# Patient Record
Sex: FEMALE | Race: WHITE | NOT HISPANIC OR LATINO | Employment: FULL TIME | URBAN - NONMETROPOLITAN AREA
[De-identification: names, ages, dates, MRNs, and addresses within clinical notes are randomized per-mention and may not be internally consistent; named-entity substitution may affect disease eponyms.]

---

## 2018-08-09 ENCOUNTER — HISTORICAL (OUTPATIENT)
Dept: ADMINISTRATIVE | Facility: HOSPITAL | Age: 43
End: 2018-08-09

## 2021-02-26 ENCOUNTER — HISTORICAL (OUTPATIENT)
Dept: ADMINISTRATIVE | Facility: HOSPITAL | Age: 46
End: 2021-02-26

## 2021-10-13 ENCOUNTER — HISTORICAL (OUTPATIENT)
Dept: ADMINISTRATIVE | Facility: HOSPITAL | Age: 46
End: 2021-10-13

## 2022-04-10 ENCOUNTER — HISTORICAL (OUTPATIENT)
Dept: ADMINISTRATIVE | Facility: HOSPITAL | Age: 47
End: 2022-04-10

## 2022-04-26 VITALS
DIASTOLIC BLOOD PRESSURE: 78 MMHG | BODY MASS INDEX: 32.37 KG/M2 | WEIGHT: 189.63 LBS | SYSTOLIC BLOOD PRESSURE: 126 MMHG | HEIGHT: 64 IN

## 2022-07-06 ENCOUNTER — HISTORICAL (OUTPATIENT)
Dept: ADMINISTRATIVE | Facility: HOSPITAL | Age: 47
End: 2022-07-06

## 2023-07-24 ENCOUNTER — TELEPHONE (OUTPATIENT)
Dept: OBSTETRICS AND GYNECOLOGY | Facility: CLINIC | Age: 48
End: 2023-07-24

## 2023-07-24 DIAGNOSIS — Z12.31 BREAST CANCER SCREENING BY MAMMOGRAM: Primary | ICD-10-CM

## 2023-07-24 NOTE — TELEPHONE ENCOUNTER
----- Message from Toshia Contreras sent at 7/24/2023  2:05 PM CDT -----  Regarding: Mammo Order  Type:  Mammogram    Caller is requesting to schedule their annual mammogram appointment.  Order is not listed in EPIC.  Please enter order and contact patient to schedule.  Name of Caller:  Where would they like the mammogram performed?  Would the patient rather a call back or a response via [facility]?   Best Call Back Number:  Additional Information: Asking for mammo order to be put into epic.

## 2023-07-28 ENCOUNTER — LAB VISIT (OUTPATIENT)
Dept: LAB | Facility: HOSPITAL | Age: 48
End: 2023-07-28
Attending: INTERNAL MEDICINE
Payer: COMMERCIAL

## 2023-07-28 DIAGNOSIS — E03.9 PRIMARY HYPOTHYROIDISM: Primary | ICD-10-CM

## 2023-07-28 LAB
T4 FREE SERPL-MCNC: 1.22 NG/DL (ref 0.78–2.19)
TSH SERPL-ACNC: 0.33 UIU/ML (ref 0.36–3.74)

## 2023-07-28 PROCEDURE — 84443 ASSAY THYROID STIM HORMONE: CPT

## 2023-07-28 PROCEDURE — 84439 ASSAY OF FREE THYROXINE: CPT

## 2023-07-28 PROCEDURE — 36415 COLL VENOUS BLD VENIPUNCTURE: CPT

## 2023-08-01 ENCOUNTER — HOSPITAL ENCOUNTER (OUTPATIENT)
Dept: RADIOLOGY | Facility: HOSPITAL | Age: 48
Discharge: HOME | End: 2023-08-01
Attending: NURSE PRACTITIONER
Payer: COMMERCIAL

## 2023-08-01 VITALS — WEIGHT: 188 LBS | BODY MASS INDEX: 32.1 KG/M2 | HEIGHT: 64 IN

## 2023-08-01 DIAGNOSIS — Z12.31 BREAST CANCER SCREENING BY MAMMOGRAM: ICD-10-CM

## 2023-08-01 PROCEDURE — 77067 SCR MAMMO BI INCL CAD: CPT | Mod: TC

## 2024-01-12 DIAGNOSIS — Z01.411 ENCOUNTER FOR GYNECOLOGICAL EXAMINATION (GENERAL) (ROUTINE) WITH ABNORMAL FINDINGS: ICD-10-CM

## 2024-01-12 DIAGNOSIS — Z80.3 FAMILY HISTORY OF MALIGNANT NEOPLASM OF BREAST: ICD-10-CM

## 2024-01-12 RX ORDER — CONJUGATED ESTROGENS AND MEDROXYPROGESTERONE ACETATE .625; 5 MG/1; MG/1
1 TABLET, SUGAR COATED ORAL
Qty: 28 TABLET | Refills: 1 | Status: SHIPPED | OUTPATIENT
Start: 2024-01-12 | End: 2024-02-15

## 2024-01-16 ENCOUNTER — TELEPHONE (OUTPATIENT)
Dept: OBSTETRICS AND GYNECOLOGY | Facility: CLINIC | Age: 49
End: 2024-01-16
Payer: COMMERCIAL

## 2024-02-01 NOTE — PROGRESS NOTES
Chief Complaint: Annual exam    Chief Complaint   Patient presents with    Annual Exam     C/O Lower back pain X2 weeks. PAP 2022, S/P Tubal / ablation. Denies hot flashes/night sweats . C/O Vaginal dryness.        HPI:   48 y.o. F  presents for an annual gyn exam. S/p tubal ligation and endometrial ablation. Currently using Prempro and Premarin vaginal cream for HRT, requesting refills. C/o vaginal dryness. Reports lower back pain x 2 weeks, denies UTI symptoms.     Last pap: 2022 NIL -HPV  MM2023 benign     FmHx: MGM c colon cancer. Pat cousin and Mat cousin c breast cancer. Denies uterine and ovarian cancers.     Labs / Significant Studies:  LMP: Ablation/ Tubal   Frequency: NA  Cycle length: 0 days  Flow: NONE  Intermenstrual bleeding: NO  Postcoital bleeding: NO  Dysmenorrhea: NO  Sexually active: YES  Dyspareunia: NO  Contraception: Tubal/ Ablation  H/o STI: HPV @ age 19  Last pap: 22, NIL, - HPV   H/o abnl pap: Yes,  LGSIL  Colposcopy: , Benign   Gardasil: 0/3  MM23, Benign   H/o abnl MMG: NEVER  Colonoscopy: Never     Family History   Problem Relation Age of Onset    Colon cancer Maternal Grandmother 68         due to CA    Breast cancer Paternal Cousin 60        Passed away from stroke    Breast cancer Maternal Cousin 55        Passed away not from CA    Uterine cancer Neg Hx     Cervical cancer Neg Hx     Ovarian cancer Neg Hx          Past Medical History:   Diagnosis Date    Abnormal Pap smear of cervix     LGSIL    Hypertension     Thyroid disease      Past Surgical History:   Procedure Laterality Date    BILATERAL TUBAL LIGATION  2004     SECTION       SECTION       SECTION      CHOLECYSTECTOMY      COLPOSCOPY      Benign    ENDOMETRIAL ABLATION      FUNCTIONAL ENDOSCOPIC SINUS SURGERY (FESS)  2002    TONSILLECTOMY AND ADENOIDECTOMY         Current Outpatient Medications:     estrogen,  conjugated,-medroxyprogesterone (PREMPRO) 0.625-5 mg per tablet, Take by mouth., Disp: , Rfl:     hydroCHLOROthiazide (HYDRODIURIL) 25 MG tablet, Take 25 mg by mouth every morning., Disp: , Rfl:     PREMARIN vaginal cream, insert 0.5 grams vaginally at bedtime for 14 days then twice weekly thereafter, Disp: 30 g, Rfl: 6    simvastatin (ZOCOR) 10 MG tablet, Take 10 mg by mouth., Disp: , Rfl:     SYNTHROID 50 mcg tablet, Take 50 mcg by mouth every morning., Disp: , Rfl:     Review of patient's allergies indicates:   Allergen Reactions    Latex Hives    Sulfa (sulfonamide antibiotics) Hives       Social History     Tobacco Use    Smoking status: Former     Types: Cigarettes    Smokeless tobacco: Never   Substance Use Topics    Alcohol use: Not Currently    Drug use: Never       Review of Systems:  General/Constitutional: Chills denies. Fatigue/weakness denies. Fever denies. Night sweats denies. Hot flashes denies    Respiratory: Cough denies. Hemoptysis denies. SOB denies. Sputum production denies. Wheezing denies .   Cardiovascular: Chest pain denies . Dizziness denies. Palpitations denies. Swelling in hands/feet denies    Gastrointestinal: Abdominal pain denies. Blood in stool denies. Constipation denies. Diarrhea denies. Heartburn denies. Nausea denies. Vomiting denies    Genitourinary: Incontinence denies. Blood in urine denies. Frequent urination denies. Painful urination denies. Urinary urgency denies. Nocturia denies    Gynecologic: Irregular menses denies. Heavy bleeding denies. Painful menses denies. Vaginal discharge denies. Vaginal odor denies. Vaginal itching denies. Vaginal lesion denies. Pelvic pain denies. Decreased libido denies. Vulvar lesion denies. Prolapse of genital organs denies. Painful intercourse denies. Postcoital bleeding denies. Vaginal dryness admits   Psychiatric: Depression denies. Anxiety denies     Physical Exam:   Vitals:    02/15/24 0852   BP: 136/86   Weight: 77.3 kg (170 lb 6.7 oz)  "  Height: 5' 4" (1.626 m)       Body mass index is 29.25 kg/m².       Chaperone: present.     General appearance: healthy, well-nourished and well-developed     Psychiatric: Orientation to time, place and person. Normal mood and affect and active, alert     Skin: Appearance: no rashes or lesions.     Neck:   Neck: supple, FROM, trachea midline. and no masses   Thyroid: no enlargement or nodules and non-tender.       Cardiovascular:   Auscultation: RRR and no murmur.   Peripheral Vascular: no varicosities, LLE edema, RLE edema, calf tenderness, and palpable cord and pedal pulses intact.     Lungs:   Respiratory effort: no intercostal retractions or accessory muscle usage.   Auscultation: no wheezing, rales/crackles, or rhonchi and clear to auscultation.     Breast:   Inspection/Palpation: no tenderness, discrete/distinct masses, skin changes, or abnormal secretions. Nipple appearance normal. Bilateral +FBC    Abdomen:   Auscultation/Inspection/Palpation: no hepatomegaly, splenomegaly, masses, tenderness or CVA tenderness and soft, non-distended bowel sounds preset.    Hernia: no palpable hernias.     Female Genitalia:    Vulva: no masses, tenderness or lesions    Bladder/Urethra: no urethral discharge or mass, normal meatus, bladder non-distended.    Vagina: no tenderness, erythema, cystocele, rectocele, abnormal vaginal discharge or vesicle(s) or ulcers. Atrophic   Cervix: no discharge, no cervical lacerations noted or motion tenderness and grossly normal    Uterus: normal size and shape and midline, non-tender, and no uterine prolapse.    Adnexa/Parametria: no parametrial tenderness or mass, no adnexal tenderness or ovarian mass.     Lymph Nodes:   Palpation: non tender submandibular nodes, axillary nodes, or inguinal nodes.     Rectal Exam:   Rectum: normal perianal skin.     Back   No CVAT, no masses palpated    Assessment:     There is no problem list on file for this patient.      Health Maintenance Due   Topic " Date Due    Hepatitis C Screening  Never done    Cervical Cancer Screening  Never done    Lipid Panel  Never done    HIV Screening  Never done    Hemoglobin A1c (Diabetic Prevention Screening)  Never done    Colorectal Cancer Screening  Never done    COVID-19 Vaccine (4 - 2023-24 season) 09/01/2023     Health Maintenance Topics with due status: Not Due       Topic Last Completion Date    TETANUS VACCINE 04/03/2014    Mammogram 08/01/2023         Plan:    Meryl was seen today for annual exam.    Diagnoses and all orders for this visit:    Abnormal gynecological examination  PAP  Counseled regarding contraceptive options, and need for contraception until no menses for 1 year.  Reviewed calcium needs, exercise, and prevention of osteoporosis.  Healthy diet and light weightbearing exercise if tolerated.  Reviewed normal perimenopausal transition.  Mammogram recommended yearly.  Colonoscopy recommended at age 45.  RTC 1 y    Hormone replacement therapy  Continue Prempro    - Reviewed the physiologic roles of estrogen, progesterone and androgens in the female both positive and negative.     - Explained that with menopause comes a dramatic reduction in these hormones and that changes take place in their absence.     - Discussed symptoms of decreased hormone levels and that these symptoms usually last 6 months to 2 years.     - Reviewed hormone replacement options as well as indications and contraindications.     - Discussed the WHI study findings and current FDA recommendations. Also discussed current recommendations for breast screening.     - Reviewed precautions when taking female hormones and symptoms to be aware of such as headache, visual change, focal weakness or numbness, SOB,chest pain, pain in thigh or calf, breast pain or lump, and vaginal bleeding. Instructed to call immediately and stop HRT if any of these symptoms occur.     -Advised patient of increased risk of stroke, heart attack, and blood clots.      Fibrocystic breast changes, bilateral  - Advised pt to decrease caffeine intake (e.g. soda, coffee, tea, chocolate, etc.)    - Advised pt to use Aspercreme PRN.     Atrophic vaginitis  Continue Premarin vaginal cream    - Educated     - Lubricants, coconut oil, baby oil     Screening mammogram for breast cancer  -     Mammo Digital Screening Bilat w/ Royal; Future    - Discussed recommendations of annual screening after age of 40 with mammogram and MRI for patients with lifetime risk greater than 25%     - Explained that screening is not 100% reliable.  Advised patient if she notices any changes to her breast including a lump, mass, dimpling, discharge, rash, or tenderness she should contact us immediately.     - Recommend monthly BSE     Low back pain, unspecified back pain laterality, unspecified chronicity, unspecified whether sciatica present  -     POCT Urinalysis, Dipstick, Automated, W/O Scope

## 2024-02-15 ENCOUNTER — OFFICE VISIT (OUTPATIENT)
Dept: OBSTETRICS AND GYNECOLOGY | Facility: CLINIC | Age: 49
End: 2024-02-15
Payer: COMMERCIAL

## 2024-02-15 VITALS
WEIGHT: 170.44 LBS | SYSTOLIC BLOOD PRESSURE: 136 MMHG | BODY MASS INDEX: 29.1 KG/M2 | DIASTOLIC BLOOD PRESSURE: 86 MMHG | HEIGHT: 64 IN

## 2024-02-15 DIAGNOSIS — N95.2 ATROPHIC VAGINITIS: ICD-10-CM

## 2024-02-15 DIAGNOSIS — Z79.890 HORMONE REPLACEMENT THERAPY: ICD-10-CM

## 2024-02-15 DIAGNOSIS — N60.12 FIBROCYSTIC BREAST CHANGES, BILATERAL: ICD-10-CM

## 2024-02-15 DIAGNOSIS — Z12.31 SCREENING MAMMOGRAM FOR BREAST CANCER: ICD-10-CM

## 2024-02-15 DIAGNOSIS — Z12.4 CERVICAL CANCER SCREENING: ICD-10-CM

## 2024-02-15 DIAGNOSIS — N60.11 FIBROCYSTIC BREAST CHANGES, BILATERAL: ICD-10-CM

## 2024-02-15 DIAGNOSIS — Z01.411 ABNORMAL GYNECOLOGICAL EXAMINATION: Primary | ICD-10-CM

## 2024-02-15 DIAGNOSIS — M54.50 LOW BACK PAIN, UNSPECIFIED BACK PAIN LATERALITY, UNSPECIFIED CHRONICITY, UNSPECIFIED WHETHER SCIATICA PRESENT: ICD-10-CM

## 2024-02-15 LAB
BILIRUB UR QL STRIP: NEGATIVE
GLUCOSE UR QL STRIP: NEGATIVE
KETONES UR QL STRIP: NEGATIVE
LEUKOCYTE ESTERASE UR QL STRIP: NEGATIVE
PH, POC UA: 7.5
POC BLOOD, URINE: NEGATIVE
POC NITRATES, URINE: NEGATIVE
PROT UR QL STRIP: NEGATIVE
SP GR UR STRIP: 1.02 (ref 1–1.03)
UROBILINOGEN UR STRIP-ACNC: 0.2 (ref 0.1–1.1)

## 2024-02-15 PROCEDURE — 1159F MED LIST DOCD IN RCRD: CPT | Mod: CPTII,,, | Performed by: NURSE PRACTITIONER

## 2024-02-15 PROCEDURE — 3075F SYST BP GE 130 - 139MM HG: CPT | Mod: CPTII,,, | Performed by: NURSE PRACTITIONER

## 2024-02-15 PROCEDURE — 3008F BODY MASS INDEX DOCD: CPT | Mod: CPTII,,, | Performed by: NURSE PRACTITIONER

## 2024-02-15 PROCEDURE — 3079F DIAST BP 80-89 MM HG: CPT | Mod: CPTII,,, | Performed by: NURSE PRACTITIONER

## 2024-02-15 PROCEDURE — 81003 URINALYSIS AUTO W/O SCOPE: CPT | Mod: QW,,, | Performed by: NURSE PRACTITIONER

## 2024-02-15 PROCEDURE — 99396 PREV VISIT EST AGE 40-64: CPT | Mod: ,,, | Performed by: NURSE PRACTITIONER

## 2024-02-15 PROCEDURE — 1160F RVW MEDS BY RX/DR IN RCRD: CPT | Mod: CPTII,,, | Performed by: NURSE PRACTITIONER

## 2024-02-15 RX ORDER — HYDROCHLOROTHIAZIDE 25 MG/1
25 TABLET ORAL EVERY MORNING
COMMUNITY
Start: 2024-02-06

## 2024-02-15 RX ORDER — CONJUGATED ESTROGENS 0.62 MG/G
CREAM VAGINAL
Qty: 30 G | Refills: 6 | Status: SHIPPED | OUTPATIENT
Start: 2024-02-15

## 2024-02-15 RX ORDER — LEVOTHYROXINE SODIUM 50 UG/1
50 TABLET ORAL EVERY MORNING
COMMUNITY
Start: 2024-01-29

## 2024-02-15 RX ORDER — CONJUGATED ESTROGENS AND MEDROXYPROGESTERONE ACETATE .625; 5 MG/1; MG/1
TABLET, SUGAR COATED ORAL
COMMUNITY
Start: 2022-12-22 | End: 2024-02-15 | Stop reason: SDUPTHER

## 2024-02-15 RX ORDER — SIMVASTATIN 10 MG/1
10 TABLET, FILM COATED ORAL
COMMUNITY
Start: 2024-02-06

## 2024-02-15 RX ORDER — CONJUGATED ESTROGENS AND MEDROXYPROGESTERONE ACETATE .625; 5 MG/1; MG/1
1 TABLET, SUGAR COATED ORAL DAILY
Qty: 28 TABLET | Refills: 12 | Status: SHIPPED | OUTPATIENT
Start: 2024-02-15

## 2024-02-19 LAB — PSYCHE PATHOLOGY RESULT: NORMAL

## 2024-08-05 ENCOUNTER — HOSPITAL ENCOUNTER (OUTPATIENT)
Dept: RADIOLOGY | Facility: HOSPITAL | Age: 49
Discharge: HOME | End: 2024-08-05
Attending: NURSE PRACTITIONER
Payer: COMMERCIAL

## 2024-08-05 DIAGNOSIS — Z12.31 SCREENING MAMMOGRAM FOR BREAST CANCER: ICD-10-CM

## 2024-08-05 PROCEDURE — 77067 SCR MAMMO BI INCL CAD: CPT | Mod: TC

## 2024-08-12 ENCOUNTER — TELEPHONE (OUTPATIENT)
Dept: OBSTETRICS AND GYNECOLOGY | Facility: CLINIC | Age: 49
End: 2024-08-12
Payer: COMMERCIAL

## 2024-08-12 DIAGNOSIS — R92.8 ABNORMAL MAMMOGRAM OF LEFT BREAST: Primary | ICD-10-CM

## 2024-08-12 NOTE — TELEPHONE ENCOUNTER
Phoned patients, sts she did receive voicemail message from Mrs. Mckeon on this morning. Will f/u with [facility]/S in [place]. No further questions or concerns voiced.

## 2024-08-12 NOTE — TELEPHONE ENCOUNTER
----- Message from Brylee Guillory sent at 8/12/2024 10:03 AM CDT -----  Regarding: Results  Contact: Meryl Ashton called stating that she was returning a missed phone call. Patient call back number [phone].

## 2024-08-27 ENCOUNTER — HOSPITAL ENCOUNTER (OUTPATIENT)
Dept: RADIOLOGY | Facility: HOSPITAL | Age: 49
Discharge: HOME | End: 2024-08-27
Attending: NURSE PRACTITIONER
Payer: COMMERCIAL

## 2024-08-27 VITALS — BODY MASS INDEX: 27.31 KG/M2 | WEIGHT: 160 LBS | HEIGHT: 64 IN

## 2024-08-27 DIAGNOSIS — R92.8 ABNORMAL MAMMOGRAM OF LEFT BREAST: ICD-10-CM

## 2024-08-27 PROCEDURE — 76642 ULTRASOUND BREAST LIMITED: CPT | Mod: TC,LT

## 2024-08-27 PROCEDURE — 77061 BREAST TOMOSYNTHESIS UNI: CPT | Mod: 26,LT,, | Performed by: STUDENT IN AN ORGANIZED HEALTH CARE EDUCATION/TRAINING PROGRAM

## 2024-08-27 PROCEDURE — 77061 BREAST TOMOSYNTHESIS UNI: CPT | Mod: TC,LT

## 2024-08-27 PROCEDURE — 77065 DX MAMMO INCL CAD UNI: CPT | Mod: 26,LT,, | Performed by: STUDENT IN AN ORGANIZED HEALTH CARE EDUCATION/TRAINING PROGRAM

## 2024-08-27 PROCEDURE — 76642 ULTRASOUND BREAST LIMITED: CPT | Mod: 26,LT,, | Performed by: STUDENT IN AN ORGANIZED HEALTH CARE EDUCATION/TRAINING PROGRAM

## 2024-08-27 PROCEDURE — 77065 DX MAMMO INCL CAD UNI: CPT | Mod: TC,LT

## 2024-08-28 NOTE — PROGRESS NOTES
Discussed results with patient.  Advised to return to annual mammogram screening August 20, 2025 or sooner p.r.n. any breast changes.  States understanding

## 2025-02-19 ENCOUNTER — OFFICE VISIT (OUTPATIENT)
Dept: OBSTETRICS AND GYNECOLOGY | Facility: CLINIC | Age: 50
End: 2025-02-19
Payer: COMMERCIAL

## 2025-02-19 VITALS
SYSTOLIC BLOOD PRESSURE: 118 MMHG | DIASTOLIC BLOOD PRESSURE: 82 MMHG | WEIGHT: 169 LBS | HEIGHT: 64 IN | BODY MASS INDEX: 28.85 KG/M2 | TEMPERATURE: 97 F

## 2025-02-19 DIAGNOSIS — Z79.890 HORMONE REPLACEMENT THERAPY (HRT): ICD-10-CM

## 2025-02-19 DIAGNOSIS — Z12.4 SCREENING FOR MALIGNANT NEOPLASM OF THE CERVIX: ICD-10-CM

## 2025-02-19 DIAGNOSIS — Z01.419 ROUTINE GYNECOLOGICAL EXAMINATION: Primary | ICD-10-CM

## 2025-02-19 RX ORDER — OMEPRAZOLE 20 MG/1
20 CAPSULE, DELAYED RELEASE ORAL
COMMUNITY
Start: 2025-02-16

## 2025-02-19 RX ORDER — LEVOTHYROXINE SODIUM 75 UG/1
75 TABLET ORAL
COMMUNITY
Start: 2025-02-16

## 2025-02-19 RX ORDER — CONJUGATED ESTROGENS 0.62 MG/G
CREAM VAGINAL
Qty: 30 G | Refills: 6 | Status: CANCELLED | OUTPATIENT
Start: 2025-02-19

## 2025-02-19 RX ORDER — VALSARTAN AND HYDROCHLOROTHIAZIDE 80; 12.5 MG/1; MG/1
1 TABLET, FILM COATED ORAL
COMMUNITY
Start: 2025-02-16

## 2025-02-19 RX ORDER — CONJUGATED ESTROGENS AND MEDROXYPROGESTERONE ACETATE .625; 5 MG/1; MG/1
1 TABLET, SUGAR COATED ORAL DAILY
Qty: 28 TABLET | Refills: 12 | Status: SHIPPED | OUTPATIENT
Start: 2025-02-19

## 2025-02-19 NOTE — PROGRESS NOTES
Chief Complaint: Annual exam    Chief Complaint   Patient presents with    Well Woman       HPI:   Meryl Ashley is a 49 y.o. year old  here for her Annual Exam. S/p tubal ligation and endometrial ablation. Currently using Prempro requesting refills. Denies complaints today    Cancer-related family history includes Breast cancer (age of onset: 55) in her maternal cousin; Breast cancer (age of onset: 60) in her paternal cousin. There is no history of Uterine cancer, Cervical cancer, or Ovarian cancer.      Gyn History:    Menstrual History  Cycle: No  Menarche Age: 11 years  No Cycle Reason: (!) Surgical  Surgical Reason: ablation    Menopause  Menopause Age: 1 years  Post Menopausal Bleeding: No  Hormone Replacement Therapy: Yes (Premarin, Prempro)    Pap History  Last pap date: 02/15/24  Result: Normal  History of Abnormal Pap: No  HPV Vaccine Completed: No (0/3)    [place]  Sexually Active: Yes  Sexual Orientation: heterosexual  Postcoital Bleeding: No  Dyspareunia: No  STI History: No  Contraception: Yes  Contraception Type: Tubal ligation/salpingectony    Breast History  Last Breast Imaging Date: Yes  Date: 24 (oval focal asymmetry)  History of Abnormal Breast Imaging : (!) Yes  Date: 24 (benign)  History of Breast Biopsy: No          Past Medical History:   Diagnosis Date    Abnormal Pap smear of cervix     LGSIL    Hypertension     Thyroid disease      Past Surgical History:   Procedure Laterality Date    BILATERAL TUBAL LIGATION       SECTION       SECTION       SECTION      CHOLECYSTECTOMY      COLPOSCOPY      Benign    ENDOMETRIAL ABLATION      FUNCTIONAL ENDOSCOPIC SINUS SURGERY (FESS)  2002    TONSILLECTOMY AND ADENOIDECTOMY       Current Medications[1]  Review of patient's allergies indicates:   Allergen Reactions    Latex Hives    Sulfa (sulfonamide antibiotics) Hives     OB History    Para Term  AB Living   3 3 3   3   SAB  IAB Ectopic Multiple Live Births       3      # Outcome Date GA Lbr Jerson/2nd Weight Sex Type Anes PTL Lv   3 Term    3.43 kg (7 lb 9 oz) M CS-LTranv EPI  PERI      Complications: Hypertension   2 Term    3.175 kg (7 lb) F CS-LTranv EPI  PERI   1 Term    3.26 kg (7 lb 3 oz) F CS-LTranv EPI  PERI      Complications: Dysfunctional Labor     Social History[2]  Family History   Problem Relation Name Age of Onset    Colon cancer Maternal Grandmother  68         due to CA    Breast cancer Paternal Cousin  60        Passed away from stroke    Breast cancer Maternal Cousin  55        Passed away not from CA    Uterine cancer Neg Hx      Cervical cancer Neg Hx      Ovarian cancer Neg Hx         Review of Systems:   Review of Systems   Constitutional:  Negative for appetite change, chills, fatigue, fever and unexpected weight change.   Eyes:  Negative for visual disturbance.   Respiratory:  Negative for cough, shortness of breath and wheezing.    Cardiovascular:  Negative for chest pain, palpitations and leg swelling.   Gastrointestinal:  Negative for abdominal pain, bloating, blood in stool, constipation, diarrhea, nausea, vomiting, reflux and fecal incontinence.   Endocrine: Negative for hair loss and hot flashes.   Genitourinary:  Negative for bladder incontinence, decreased libido, dysmenorrhea, dyspareunia, dysuria, flank pain, frequency, genital sores, hematuria, hot flashes, menorrhagia, menstrual problem, pelvic pain, urgency, vaginal bleeding, vaginal discharge, vaginal pain, urinary incontinence, postcoital bleeding, postmenopausal bleeding, vaginal dryness and vaginal odor.   Integumentary:  Negative for rash, acne, hair changes, breast mass, nipple discharge, breast skin changes and breast tenderness.   Neurological:  Negative for headaches.   Psychiatric/Behavioral:  Negative for depression.    Breast: Negative for asymmetry, breast self exam, lump, mass, mastodynia, nipple discharge, skin changes and  "tenderness       Physical Exam:  /82 (BP Location: Left arm, Patient Position: Sitting)   Temp 96.8 °F (36 °C) (Temporal)   Ht 5' 4" (1.626 m)   Wt 76.7 kg (169 lb)   LMP  (LMP Unknown)   BMI 29.01 kg/m²       Physical Exam:   Constitutional: She is oriented to person, place, and time. She appears well-developed and well-nourished.    HENT:   Head: Normocephalic.      Cardiovascular:       Exam reveals no edema.        Pulmonary/Chest: Effort normal. She exhibits no mass, no tenderness, no bony tenderness, no deformity and no retraction. Right breast exhibits no inverted nipple, no mass, no nipple discharge, no skin change, no tenderness, no bleeding, no swelling, no mastectomy, no augmentation and no lumpectomy. Left breast exhibits no inverted nipple, no mass, no nipple discharge, no skin change, no tenderness, no bleeding, no swelling, no mastectomy, no augmentation and no lumpectomy. Breasts are symmetrical.        Abdominal: Soft. She exhibits no distension and no mass. There is no abdominal tenderness. There is no rebound and no guarding. No hernia. Hernia confirmed negative in the right inguinal area.     Genitourinary:    Inguinal canal, vagina, uterus, right adnexa, left adnexa and rectum normal.   Rectum:      No anal fissure or external hemorrhoid.   The external female genitalia was normal.   No external genitalia lesions identified,Genitalia hair distrobution normal .     Labial bartholins normal.There is no rash, tenderness, lesion or injury on the right labia. There is no rash, tenderness, lesion or injury on the left labia. Cervix is normal. No no masses or organomegaly. Right adnexum displays no mass, no tenderness and no fullness. Left adnexum displays no mass, no tenderness and no fullness. Vagina exhibits no lesion. No erythema, vaginal discharge, tenderness, bleeding, rectocele, cystocele or prolapse of vaginal walls in the vagina.    No foreign body in the vagina.      No signs of " injury in the vagina.   Vagina was moist.Cervix exhibits no motion tenderness, no lesion, no discharge, no friability, no tenderness and no polyp. Uterus consistancy normal and Uerus contour normal  Uterus is not deviated, not enlarged, not fixed, not tender, not hosting fibroids and no uterine prolapse. Normal urethral meatus.Urethral Meatus exhibits: no urethral lesionUrethra findings: no urethral mass, no tenderness and no prolapsedBladder findings: no bladder tenderness          Musculoskeletal: Normal range of motion.      Lymphadenopathy: No inguinal adenopathy noted on the right or left side.    Neurological: She is alert and oriented to person, place, and time.    Skin: Skin is warm and dry.    Psychiatric: She has a normal mood and affect. Her behavior is normal. Judgment and thought content normal.        Assessment:   Annual Well Women Exam  1. Routine gynecological examination    2. Hormone replacement therapy (HRT)        Plan:  Pap Done  Breast Self-awareness  Recommend annual mammogram  Recommend exercise at least 3 times weekly  Healthy, balanced diet  Keep yearly follow up with PCP  No follow-ups on file.   Meryl was seen today for well woman.    Diagnoses and all orders for this visit:    Routine gynecological examination    Hormone replacement therapy (HRT)    Other orders  -     estrogen, conjugated,-medroxyprogesterone (PREMPRO) 0.625-5 mg per tablet; Take 1 tablet by mouth once daily.  The following orders have not been finalized:  -     Cancel: conjugated estrogens (PREMARIN) vaginal cream        - Reviewed the physiologic roles of estrogen, progesterone and androgens in the female both positive and negative.   - Explained that with menopause comes a dramatic reduction in these hormones and that changes take place in their absence.   - Discussed symptoms of decreased hormone levels and that these symptoms usually last 6 months to 2 years.   - Reviewed hormone replacement options as well as  indications and contraindications.   - Discussed the WHI study findings and current FDA recommendations. Also discussed current recommendations for breast screening.   - Reviewed precautions when taking female hormones and symptoms to be aware of such as headache, visual change, focal weakness or numbness, SOB,chest pain, pain in thigh or calf, breast pain or lump, and vaginal bleeding. Instructed to call immediately and stop HRT if any of these symptoms occur.   -Advised patient of increased risk of stroke, heart attack, and blood clots.     Cont Premarin and Prempro as directed. Refills sent     RTC 1yr or prn    Counseling:    A brief discussion of STD prevention was had.    Avoidance of cigarette smoking, alcohol use, and drug use was encouraged.    A healthy diet and regular exercise was stressed.    All questions were answered and the patient voiced understanding of the above issues.      This note was transcribed by Crissy Renee. There may be transcription errors as a result, however minimal. Effort has been made to ensure accuracy of transcription, but any obvious errors or omissions should be clarified with the author of the document.       I agree with the above documentation.                [1]   Current Outpatient Medications:     conjugated estrogens (PREMARIN) vaginal cream, insert 0.5 grams vaginally at bedtime for 14 days then twice weekly thereafter, Disp: 30 g, Rfl: 6    omeprazole (PRILOSEC) 20 MG capsule, Take 20 mg by mouth., Disp: , Rfl:     simvastatin (ZOCOR) 10 MG tablet, Take 10 mg by mouth., Disp: , Rfl:     SYNTHROID 75 mcg tablet, Take 75 mcg by mouth., Disp: , Rfl:     valsartan-hydrochlorothiazide (DIOVAN-HCT) 80-12.5 mg per tablet, Take 1 tablet by mouth., Disp: , Rfl:     estrogen, conjugated,-medroxyprogesterone (PREMPRO) 0.625-5 mg per tablet, Take 1 tablet by mouth once daily., Disp: 28 tablet, Rfl: 12  [2]   Social History  Tobacco Use    Smoking status: Former     Types:  Cigarettes    Smokeless tobacco: Never   Substance Use Topics    Alcohol use: Not Currently    Drug use: Never

## 2025-02-25 ENCOUNTER — RESULTS FOLLOW-UP (OUTPATIENT)
Dept: OBSTETRICS AND GYNECOLOGY | Facility: CLINIC | Age: 50
End: 2025-02-25